# Patient Record
Sex: FEMALE | Race: WHITE | NOT HISPANIC OR LATINO
[De-identification: names, ages, dates, MRNs, and addresses within clinical notes are randomized per-mention and may not be internally consistent; named-entity substitution may affect disease eponyms.]

---

## 2018-04-11 ENCOUNTER — RX ONLY (OUTPATIENT)
Age: 28
Setting detail: RX ONLY
End: 2018-04-11

## 2018-04-11 RX ORDER — EMOLLIENT COMBINATION NO.53
CREAM (GRAM) TOPICAL
Qty: 1 | Refills: 1 | Status: ERX | COMMUNITY
Start: 2018-04-11

## 2018-08-14 ENCOUNTER — APPOINTMENT (RX ONLY)
Dept: URBAN - METROPOLITAN AREA CLINIC 77 | Facility: CLINIC | Age: 28
Setting detail: DERMATOLOGY
End: 2018-08-14

## 2018-08-14 DIAGNOSIS — L90.5 SCAR CONDITIONS AND FIBROSIS OF SKIN: ICD-10-CM

## 2018-08-14 DIAGNOSIS — L85.3 XEROSIS CUTIS: ICD-10-CM

## 2018-08-14 PROCEDURE — 99213 OFFICE O/P EST LOW 20 MIN: CPT

## 2018-08-14 PROCEDURE — ? TREATMENT REGIMEN

## 2018-08-14 PROCEDURE — ? COUNSELING

## 2018-08-14 NOTE — HPI: SCAR
Is This A New Presentation, Or A Follow-Up?: Scar
How Severe Is Your Scar?: mild
Additional History: Patient had a lip piercing on the right side of the lip last December, and took it out because of irritation and was in the way while eating. Patient has done laser treatments and microneedling with hoang Manuel with improvement but would like more consultation as there is still a “pit”present.

## 2018-08-14 NOTE — PROCEDURE: TREATMENT REGIMEN
Plan: Location: lip\\nPlan: excision \\n\\nPatient had a piercing on the left side of the lip last December, and took it out because of irritation while eating. Patient has done 2 laser treatments and 3 microneedling treatments with  Mar with improvement but would like more consultation as there is still a “pit”present.\\n\\nDiscussed with patient microneedling and laser would not get rid of the hole\\nDiscussed with patient we would need to excise the hole. Initially we would use a punch tool to only excise the hole and suture the healthy tissue leaving small stitches \\nDiscussed with patient this would be considered a medical treatment, and should heal without an issue\\n\\nF/u excision
Detail Level: Zone

## 2018-09-18 ENCOUNTER — APPOINTMENT (RX ONLY)
Dept: URBAN - METROPOLITAN AREA CLINIC 77 | Facility: CLINIC | Age: 28
Setting detail: DERMATOLOGY
End: 2018-09-18

## 2018-09-18 DIAGNOSIS — L90.5 SCAR CONDITIONS AND FIBROSIS OF SKIN: ICD-10-CM

## 2018-09-18 DIAGNOSIS — L85.3 XEROSIS CUTIS: ICD-10-CM

## 2018-09-18 PROBLEM — D48.5 NEOPLASM OF UNCERTAIN BEHAVIOR OF SKIN: Status: ACTIVE | Noted: 2018-09-18

## 2018-09-18 PROCEDURE — 99213 OFFICE O/P EST LOW 20 MIN: CPT | Mod: 25

## 2018-09-18 PROCEDURE — ? BIOPSY BY PUNCH METHOD

## 2018-09-18 PROCEDURE — ? COUNSELING

## 2018-09-18 PROCEDURE — 11100: CPT

## 2018-09-18 ASSESSMENT — LOCATION ZONE DERM: LOCATION ZONE: LIP

## 2018-09-18 ASSESSMENT — LOCATION SIMPLE DESCRIPTION DERM: LOCATION SIMPLE: LEFT LIP

## 2018-09-18 ASSESSMENT — LOCATION DETAILED DESCRIPTION DERM: LOCATION DETAILED: LEFT LOWER CUTANEOUS LIP

## 2018-09-18 NOTE — PROCEDURE: BIOPSY BY PUNCH METHOD
Was A Bandage Applied: Yes
Bill For Surgical Tray: no
Billing Type: Third-Party Bill
Dressing: bandage
Wound Care: Bacitracin
Lab Facility: [id number]
Post-Care Instructions: I reviewed with the patient in detail post-care instructions. Patient is to keep the biopsy site dry overnight, and then apply bacitracin twice daily until healed. Patient may apply hydrogen peroxide soaks to remove any crusting.
Consent: Written consent was obtained and risks were reviewed including but not limited to scarring, infection, bleeding, scabbing, incomplete removal, nerve damage and allergy to anesthesia.
Hemostasis: None
X Depth Of Punch In Cm (Optional): 0
Punch Size In Mm: 2.5
Notification Instructions: Patient will be notified of biopsy results. However, patient instructed to call the office if not contacted within 2 weeks.
Anesthesia Volume In Cc (Will Not Render If 0): 0.5
Biopsy Type: H and E
Suture Removal: 7 days
Home Suture Removal Text: Patient was provided a home suture removal kit and will remove their sutures at home.  If they have any questions or difficulties they will call the office.
Lab: [id number]
Detail Level: Simple
Anesthesia Type: 1% lidocaine with epinephrine
Body Location Override (Optional - Billing Will Still Be Based On Selected Body Map Location If Applicable): lip
Epidermal Sutures: 5-0 Ethilon

## 2018-09-25 ENCOUNTER — APPOINTMENT (RX ONLY)
Dept: URBAN - METROPOLITAN AREA CLINIC 77 | Facility: CLINIC | Age: 28
Setting detail: DERMATOLOGY
End: 2018-09-25

## 2018-09-25 DIAGNOSIS — Z48.02 ENCOUNTER FOR REMOVAL OF SUTURES: ICD-10-CM

## 2018-09-25 DIAGNOSIS — L85.3 XEROSIS CUTIS: ICD-10-CM

## 2018-09-25 PROCEDURE — ? SUTURE REMOVAL (GLOBAL PERIOD)

## 2018-09-25 PROCEDURE — ? COUNSELING

## 2018-09-25 ASSESSMENT — LOCATION DETAILED DESCRIPTION DERM
LOCATION DETAILED: LEFT INFERIOR VERMILION LIP
LOCATION DETAILED: LEFT SUPERIOR VERMILION LIP

## 2018-09-25 ASSESSMENT — LOCATION SIMPLE DESCRIPTION DERM: LOCATION SIMPLE: LEFT LIP

## 2018-09-25 ASSESSMENT — LOCATION ZONE DERM: LOCATION ZONE: LIP

## 2018-09-25 NOTE — PROCEDURE: SUTURE REMOVAL (GLOBAL PERIOD)
Body Location Override (Optional - Billing Will Still Be Based On Selected Body Map Location If Applicable): lip
Add 62881 Cpt? (Important Note: In 2017 The Use Of 86382 Is Being Tracked By Cms To Determine Future Global Period Reimbursement For Global Periods): no
Detail Level: Detailed

## 2020-06-22 ENCOUNTER — RX ONLY (OUTPATIENT)
Age: 30
Setting detail: RX ONLY
End: 2020-06-22

## 2020-06-22 RX ORDER — EMOLLIENT COMBINATION NO.53
CREAM (GRAM) TOPICAL
Qty: 1 | Refills: 0 | Status: ERX

## 2021-04-01 ENCOUNTER — APPOINTMENT (RX ONLY)
Dept: URBAN - METROPOLITAN AREA CLINIC 77 | Facility: CLINIC | Age: 31
Setting detail: DERMATOLOGY
End: 2021-04-01

## 2021-04-01 DIAGNOSIS — Z41.9 ENCOUNTER FOR PROCEDURE FOR PURPOSES OTHER THAN REMEDYING HEALTH STATE, UNSPECIFIED: ICD-10-CM

## 2021-04-01 PROCEDURE — ? MICRONEEDLING

## 2021-04-01 PROCEDURE — ? PLATELET RICH PLASMA INJECTION

## 2021-04-01 ASSESSMENT — LOCATION SIMPLE DESCRIPTION DERM
LOCATION SIMPLE: CHIN
LOCATION SIMPLE: NOSE
LOCATION SIMPLE: RIGHT ANTERIOR NECK
LOCATION SIMPLE: LEFT FOREHEAD
LOCATION SIMPLE: LEFT CHEEK
LOCATION SIMPLE: RIGHT CHEEK
LOCATION SIMPLE: LEFT ANTERIOR NECK

## 2021-04-01 ASSESSMENT — LOCATION ZONE DERM
LOCATION ZONE: FACE
LOCATION ZONE: NECK
LOCATION ZONE: NOSE

## 2021-04-01 ASSESSMENT — LOCATION DETAILED DESCRIPTION DERM
LOCATION DETAILED: RIGHT CENTRAL MALAR CHEEK
LOCATION DETAILED: LEFT CENTRAL MALAR CHEEK
LOCATION DETAILED: LEFT MEDIAL FOREHEAD
LOCATION DETAILED: LEFT SUPERIOR MEDIAL FOREHEAD
LOCATION DETAILED: RIGHT SUPERIOR LATERAL NECK
LOCATION DETAILED: RIGHT INFERIOR MEDIAL MALAR CHEEK
LOCATION DETAILED: LEFT SUPERIOR ANTERIOR NECK
LOCATION DETAILED: LEFT CHIN
LOCATION DETAILED: RIGHT LATERAL MALAR CHEEK
LOCATION DETAILED: NASAL SUPRATIP
LOCATION DETAILED: NASAL INFRATIP
LOCATION DETAILED: LEFT SUPERIOR LATERAL NECK
LOCATION DETAILED: LEFT INFERIOR MEDIAL MALAR CHEEK

## 2021-04-01 NOTE — PROCEDURE: MICRONEEDLING
Depth In Mm: 2.5
Length Of Topical Anesthesia Application (Optional): 30 minutes
Post-Care Instructions: After the procedure, take precautions agains sun exposure. Use Gentle Cleanser & Tensage products as recommended. Do not apply sunscreen for 12 hours after the procedure. Do not apply make-up for 12 hours after the procedure. Advised patient not to cleanse until tomorrow morning and no working out or sweating until the following day to prevent further skin irritation. Avoid alcohol based toners for 1-2 days. After 2-3 days patients can return to their regular skin regimen. Applied Skinfuse HA Glide post treatment. Gave patient samples of Gentle cleanser, Skinfuse HA Glide and Sunscreen for post care, as well as detailed post care instructions on how to take care of the treated area.
Topical Anesthesia?: BLT cream (benzocaine 20%, lidocaine 10%, tetracaine 10%)
Location #1: forehead,around eyes, nose and neck
Consent: Written consent obtained, risks reviewed including but not limited to pain, scarring, infection and incomplete improvement.  Patient understands the procedure is cosmetic in nature and will require out of pocket payment.  Cleansed with cleanser, extractions performed and sanitized with alcohol swab.
Location #2: cheeks,lip chin
Depth In Mm: 0.1
Price (Use Numbers Only, No Special Characters Or $): 250.00
Infusions (Optional): PRP
Depth In Mm: 1
Detail Level: Zone

## 2021-04-01 NOTE — PROCEDURE: PLATELET RICH PLASMA INJECTION
Depth In Mm (Will Not Render If 0): 0
Location #2: To glide with through out face with SkinPen
Amount Of Blood Processed (Optional - Include Units): 11ml
Post-Care Instructions: After the procedure, take precautions agains sun exposure. Do not apply sunscreen for 12 hours after the procedure. Do not apply make-up for 12 hours after the procedure. Avoid alcohol based toners for 10-14 days. After 2-3 days patients can return to their regular skin regimen.
Which Technique?: Default
Depth In Mm (Will Not Render If 0): 2.5
Price (Use Numbers Only, No Special Characters Or $): 350.00
Amount Injected At This Location In Cc (Will Not Render If 0): 1.5
Amount Injected At This Location In Cc (Will Not Render If 0): 6
Show Additional Techniques: No
Detail Level: Zone
Location #1: nasolabial folds and marionette lines
Consent: Written consent obtained, risks reviewed including but not limited to pain, scarring, infection and incomplete improvement.  Patient understands the procedure is cosmetic in nature and will require out of pocket payment.

## 2021-09-30 ENCOUNTER — APPOINTMENT (RX ONLY)
Dept: URBAN - METROPOLITAN AREA CLINIC 77 | Facility: CLINIC | Age: 31
Setting detail: DERMATOLOGY
End: 2021-09-30

## 2021-09-30 DIAGNOSIS — Z41.9 ENCOUNTER FOR PROCEDURE FOR PURPOSES OTHER THAN REMEDYING HEALTH STATE, UNSPECIFIED: ICD-10-CM

## 2021-09-30 PROCEDURE — ? PLATELET RICH PLASMA INJECTION

## 2021-09-30 PROCEDURE — ? MICRONEEDLING

## 2021-09-30 ASSESSMENT — LOCATION DETAILED DESCRIPTION DERM
LOCATION DETAILED: RIGHT SUPERIOR LATERAL NECK
LOCATION DETAILED: LEFT SUPERIOR LATERAL NECK
LOCATION DETAILED: RIGHT CENTRAL MALAR CHEEK
LOCATION DETAILED: LEFT SUPERIOR ANTERIOR NECK
LOCATION DETAILED: SUPERIOR MID FOREHEAD
LOCATION DETAILED: LEFT CENTRAL MALAR CHEEK
LOCATION DETAILED: LEFT MEDIAL FOREHEAD
LOCATION DETAILED: NASAL TIP
LOCATION DETAILED: LEFT CHIN
LOCATION DETAILED: RIGHT CHIN
LOCATION DETAILED: RIGHT INFERIOR CENTRAL MALAR CHEEK

## 2021-09-30 ASSESSMENT — LOCATION SIMPLE DESCRIPTION DERM
LOCATION SIMPLE: LEFT ANTERIOR NECK
LOCATION SIMPLE: LEFT FOREHEAD
LOCATION SIMPLE: SUPERIOR FOREHEAD
LOCATION SIMPLE: RIGHT ANTERIOR NECK
LOCATION SIMPLE: CHIN
LOCATION SIMPLE: RIGHT CHEEK
LOCATION SIMPLE: LEFT CHEEK
LOCATION SIMPLE: NOSE

## 2021-09-30 ASSESSMENT — LOCATION ZONE DERM
LOCATION ZONE: NECK
LOCATION ZONE: FACE
LOCATION ZONE: NOSE

## 2021-09-30 NOTE — PROCEDURE: PLATELET RICH PLASMA INJECTION
Depth In Mm (Will Not Render If 0): 2.5
Price (Use Numbers Only, No Special Characters Or $): 350.00
Depth In Mm (Will Not Render If 0): 0
Which Technique?: Default
Show Additional Techniques: No
Consent: Written consent obtained, risks reviewed including but not limited to pain, scarring, infection and incomplete improvement.  Patient understands the procedure is cosmetic in nature and will require out of pocket payment.
Detail Level: Zone
Amount Injected At This Location In Cc (Will Not Render If 0): 6
Post-Care Instructions: After the procedure, take precautions agains sun exposure. Do not apply sunscreen for 12 hours after the procedure. Do not apply make-up for 12 hours after the procedure. Avoid alcohol based toners for 10-14 days. After 2-3 days patients can return to their regular skin regimen.
Location #1: to glide with through out the face using skinpen
Location #2: injected in acne scars throughout cheeks
Amount Of Blood Processed (Optional - Include Units): 11ml

## 2021-09-30 NOTE — PROCEDURE: MICRONEEDLING
Infusions (Optional): PRP
Topical Anesthesia?: BLT ointment (benzocaine 20%, lidocaine 10%, tetracaine 10%)
Depth In Mm: 0.1
Consent: Written consent obtained, risks reviewed including but not limited to pain, scarring, infection and incomplete improvement.  Patient understands the procedure is cosmetic in nature and will require out of pocket payment.  Cleansed with cleanser, extractions performed and sanitized with alcohol swab.
Location #1: forehead, around eyes, nose and neck
Depth In Mm: 2.5
Length Of Topical Anesthesia Application (Optional): 40 minutes
Post-Care Instructions: After the procedure, take precautions agains sun exposure. Use Gentle Cleanser & Tensage products as recommended. Do not apply sunscreen for 12 hours after the procedure. Do not apply make-up for 12 hours after the procedure. Advised patient not to cleanse until tomorrow morning and no working out or sweating until the following day to prevent further skin irritation. Avoid alcohol based toners for 1-2 days. After 2-3 days patients can return to their regular skin regimen. Applied Skinfuse HA Glide post treatment. Gave patient samples of Gentle cleanser, Skinfuse HA Glide and Sunscreen for post care, as well as detailed post care instructions on how to take care of the treated area.
Detail Level: Zone
Depth In Mm: 1
Price (Use Numbers Only, No Special Characters Or $): 250.00
Location #2: lip, chin and cheeks

## 2022-07-11 ENCOUNTER — APPOINTMENT (RX ONLY)
Dept: URBAN - METROPOLITAN AREA CLINIC 77 | Facility: CLINIC | Age: 32
Setting detail: DERMATOLOGY
End: 2022-07-11

## 2022-07-11 DIAGNOSIS — Z41.9 ENCOUNTER FOR PROCEDURE FOR PURPOSES OTHER THAN REMEDYING HEALTH STATE, UNSPECIFIED: ICD-10-CM

## 2022-07-11 PROCEDURE — ? MICRONEEDLING

## 2022-07-11 ASSESSMENT — LOCATION DETAILED DESCRIPTION DERM
LOCATION DETAILED: LEFT MEDIAL FOREHEAD
LOCATION DETAILED: LEFT CENTRAL MALAR CHEEK
LOCATION DETAILED: RIGHT CHIN
LOCATION DETAILED: NASAL TIP
LOCATION DETAILED: RIGHT CENTRAL MALAR CHEEK

## 2022-07-11 ASSESSMENT — LOCATION SIMPLE DESCRIPTION DERM
LOCATION SIMPLE: CHIN
LOCATION SIMPLE: NOSE
LOCATION SIMPLE: LEFT FOREHEAD
LOCATION SIMPLE: RIGHT CHEEK
LOCATION SIMPLE: LEFT CHEEK

## 2022-07-11 ASSESSMENT — LOCATION ZONE DERM
LOCATION ZONE: NOSE
LOCATION ZONE: FACE

## 2022-07-11 NOTE — PROCEDURE: MICRONEEDLING
Infusions (Optional): PRP
Topical Anesthesia?: BLT ointment (benzocaine 20%, lidocaine 10%, tetracaine 10%)
Depth In Mm: 0.1
Consent: Written consent obtained, risks reviewed including but not limited to pain, scarring, infection and incomplete improvement.  Patient understands the procedure is cosmetic in nature and will require out of pocket payment.  Cleansed with cleanser, extractions performed and sanitized with alcohol swab.
Location #1: forehead, cheeks, chin
Depth In Mm: 1
Length Of Topical Anesthesia Application (Optional): 30 minutes
Post-Care Instructions: After the procedure, take precautions agains sun exposure. Use Gentle Cleanser & Tensage products as recommended. Do not apply sunscreen for 12 hours after the procedure. Do not apply make-up for 12 hours after the procedure. Advised patient not to cleanse until tomorrow morning and no working out or sweating until the following day to prevent further skin irritation. Avoid alcohol based toners for 1-2 days. After 2-3 days patients can return to their regular skin regimen. Applied Skinfuse HA Glide post treatment. Gave patient samples of Gentle cleanser, Skinfuse HA Glide and Sunscreen for post care, as well as detailed post care instructions on how to take care of the treated area.
Detail Level: Zone
Price (Use Numbers Only, No Special Characters Or $): 278
Location #2: lip, eyes, temple

## 2022-07-11 NOTE — HPI: OTHER
Condition:: Micro-needling with PRP infusion
Please Describe Your Condition:: Acne scars, skin texture and pigmentation

## 2022-08-05 ENCOUNTER — APPOINTMENT (RX ONLY)
Dept: URBAN - METROPOLITAN AREA CLINIC 77 | Facility: CLINIC | Age: 32
Setting detail: DERMATOLOGY
End: 2022-08-05

## 2022-08-05 DIAGNOSIS — Z41.9 ENCOUNTER FOR PROCEDURE FOR PURPOSES OTHER THAN REMEDYING HEALTH STATE, UNSPECIFIED: ICD-10-CM

## 2022-08-05 PROCEDURE — ? VENUS VERSA IPL

## 2022-08-05 ASSESSMENT — LOCATION ZONE DERM: LOCATION ZONE: FACE

## 2022-08-05 ASSESSMENT — LOCATION SIMPLE DESCRIPTION DERM
LOCATION SIMPLE: CHIN
LOCATION SIMPLE: RIGHT CHEEK
LOCATION SIMPLE: LEFT CHEEK
LOCATION SIMPLE: INFERIOR FOREHEAD

## 2022-08-05 ASSESSMENT — LOCATION DETAILED DESCRIPTION DERM
LOCATION DETAILED: RIGHT CHIN
LOCATION DETAILED: LEFT INFERIOR MEDIAL MALAR CHEEK
LOCATION DETAILED: RIGHT INFERIOR CENTRAL MALAR CHEEK
LOCATION DETAILED: RIGHT INFERIOR MEDIAL MALAR CHEEK
LOCATION DETAILED: INFERIOR MID FOREHEAD
LOCATION DETAILED: LEFT INFERIOR CENTRAL MALAR CHEEK

## 2022-08-05 NOTE — PROCEDURE: VENUS VERSA IPL
Detail Level: Zone
Frequency: 1 Hz
Pulse Duration (In Milliseconds): 10
Skin Type (Optional): II
Consent: Written consent obtained, risks reviewed including but not limited to crusting, scabbing, blistering, scarring, darker or lighter pigmentary change, bruising, and/or incomplete response.
Fluence: 14
Pulse Mode: single
Post-Care Instructions: I reviewed with the patient in detail post-care instructions. Patient should stay away from the sun and wear sun protection until treated areas are fully healed.
Number Of Passes: 2
Fluence Units: J/cm2
Treatment Number: 1
Price (Use Numbers Only, No Special Characters Or $): 300
Anesthesia Volume In Cc: 0
Applicator: [facility]
Treated Area: medium area
Coolin

## 2022-08-12 ENCOUNTER — APPOINTMENT (RX ONLY)
Dept: URBAN - METROPOLITAN AREA CLINIC 77 | Facility: CLINIC | Age: 32
Setting detail: DERMATOLOGY
End: 2022-08-12

## 2022-08-12 DIAGNOSIS — Z41.9 ENCOUNTER FOR PROCEDURE FOR PURPOSES OTHER THAN REMEDYING HEALTH STATE, UNSPECIFIED: ICD-10-CM

## 2022-08-12 PROCEDURE — ? BOTOX (U OR CC)

## 2022-08-12 ASSESSMENT — LOCATION DETAILED DESCRIPTION DERM
LOCATION DETAILED: RIGHT SUPERIOR MEDIAL FOREHEAD
LOCATION DETAILED: LEFT SUPERIOR FOREHEAD
LOCATION DETAILED: GLABELLA
LOCATION DETAILED: RIGHT INFERIOR MEDIAL FOREHEAD
LOCATION DETAILED: LEFT FOREHEAD
LOCATION DETAILED: LEFT INFERIOR MEDIAL FOREHEAD
LOCATION DETAILED: RIGHT SUPERIOR FOREHEAD
LOCATION DETAILED: LEFT MEDIAL FOREHEAD
LOCATION DETAILED: RIGHT FOREHEAD
LOCATION DETAILED: LEFT CENTRAL EYEBROW

## 2022-08-12 ASSESSMENT — LOCATION SIMPLE DESCRIPTION DERM
LOCATION SIMPLE: RIGHT FOREHEAD
LOCATION SIMPLE: GLABELLA
LOCATION SIMPLE: LEFT EYEBROW
LOCATION SIMPLE: LEFT FOREHEAD

## 2022-08-12 ASSESSMENT — LOCATION ZONE DERM: LOCATION ZONE: FACE

## 2022-08-12 NOTE — PROCEDURE: BOTOX (U OR CC)
Anterior Platysmal Bands Units: 0
Reconstitution Date (Optional): 8/9/22
Consent: Written consent obtained. Risks include but not limited to lid/brow ptosis, bruising, swelling, diplopia, temporary effect, incomplete chemical denervation.
Including Pricing Information In The Note: No
Lot #: [id number]
Glabellar Complex Units: 6
Expiration Date (Month Year): 06/2024
Forehead Units/Cc: 15
Document As Units Or Cc?: units
Post-Care Instructions: Patient instructed to not lie down for 4 hours and limit physical activity for 24 hours.
Dilution (U/0.1 Cc): 4
Detail Level: Detailed
Price (Use Numbers Only, No Special Characters Or $): 294
Quantity Per Injection Site (Units Or Cc): 2 U
Quantity Per Injection Site (Units Or Cc): 1 U

## 2022-08-25 ENCOUNTER — APPOINTMENT (RX ONLY)
Dept: URBAN - METROPOLITAN AREA CLINIC 77 | Facility: CLINIC | Age: 32
Setting detail: DERMATOLOGY
End: 2022-08-25

## 2022-08-25 DIAGNOSIS — Z41.9 ENCOUNTER FOR PROCEDURE FOR PURPOSES OTHER THAN REMEDYING HEALTH STATE, UNSPECIFIED: ICD-10-CM

## 2022-08-25 PROCEDURE — ? HYDRAFACIAL

## 2022-08-25 NOTE — HPI: OTHER
Condition:: Hydra facial
Please Describe Your Condition:: is an established patient who is being seen for a chief complaint of skin dehydration and rejuvenation

## 2022-08-25 NOTE — PROCEDURE: HYDRAFACIAL
Number Of Passes: 1
Solution Override
Vacuum Pressure High Setting (Will Not Render If Set To 0): 16
Solution: Activ-4
Procedure: Peel
Vacuum Pressure High Setting (Will Not Render If Set To 0): 16
Solution: Antiox-6
Tip: Hydropeel Tip, Teal
Tip: Hydropeel Tip, Clear
Vacuum Pressure Low Setting (Will Not Render If Set To 0): 14
Vacuum Pressure Low Setting (Will Not Render If Set To 0): 14
Price (Use Numbers Only, No Special Characters Or $): 550
Solution: GlySal 7.5%
Procedure: Fusion
Procedure: Extraction
Tip Override
Consent: Written consent obtained, risks reviewed including but not limited to crusting, scabbing, blistering, scarring, darker or lighter pigmentary change, bruising, and/or incomplete response.
Number Of Passes: 2
Tip: Hydropeel Tip, Blue
Indication: dehydrated skin
Post-Care Instructions: I reviewed with the patient in detail post-care instructions. Patient should stay away from the sun and wear sun protection until treated areas are fully healed.
Procedure: Exfoliation
Solution: Beta-HD
Location: face
Procedure: Extend and Protect

## 2022-09-12 ENCOUNTER — APPOINTMENT (RX ONLY)
Dept: URBAN - METROPOLITAN AREA CLINIC 77 | Facility: CLINIC | Age: 32
Setting detail: DERMATOLOGY
End: 2022-09-12

## 2022-09-12 DIAGNOSIS — Z41.9 ENCOUNTER FOR PROCEDURE FOR PURPOSES OTHER THAN REMEDYING HEALTH STATE, UNSPECIFIED: ICD-10-CM

## 2022-09-12 PROCEDURE — ? MICRONEEDLING

## 2022-09-12 NOTE — PROCEDURE: MICRONEEDLING
Location #2: Cheeks
Depth In Mm: 0.5
Infusions (Optional): PRP
Post-Care Instructions: After the procedure, take precautions agains sun exposure. Do not apply sunscreen for 12 hours after the procedure. Do not apply make-up for 12 hours after the procedure. Avoid alcohol based toners for 10-14 days. After 2-3 days patients can return to their regular skin regimen.
Treatment Number (Optional): 0
Consent: Written consent obtained, risks reviewed including but not limited to pain, scarring, infection and incomplete improvement.  Patient understands the procedure is cosmetic in nature and will require out of pocket payment.
Depth In Mm: 1
Price (Use Numbers Only, No Special Characters Or $): 988
Detail Level: Zone
Depth In Mm: 1.5
Location #3: eyes nose
Topical Anesthesia?: BLT cream (benzocaine 20%, lidocaine 10%, tetracaine 10%)
Depth In Mm: 0.1
Location #1: Forehead temples chin
Length Of Topical Anesthesia Application (Optional): 30 minutes

## 2022-09-12 NOTE — HPI: COSMETIC (MICRONEEDLING)
Have You Had Microneedling Treatments Before?: has had a previous microneedling treatment
When Was Your Last Treatment?: 07/11/2022

## 2022-10-10 ENCOUNTER — APPOINTMENT (RX ONLY)
Dept: URBAN - METROPOLITAN AREA CLINIC 77 | Facility: CLINIC | Age: 32
Setting detail: DERMATOLOGY
End: 2022-10-10

## 2022-10-10 DIAGNOSIS — Z41.9 ENCOUNTER FOR PROCEDURE FOR PURPOSES OTHER THAN REMEDYING HEALTH STATE, UNSPECIFIED: ICD-10-CM

## 2022-10-10 PROCEDURE — ? MICRONEEDLING

## 2022-10-10 NOTE — PROCEDURE: MICRONEEDLING
Location #2: Cheeks
Depth In Mm: 0.5
Infusions (Optional): PRP
Post-Care Instructions: After the procedure, take precautions agains sun exposure. Do not apply sunscreen for 12 hours after the procedure. Do not apply make-up for 12 hours after the procedure. Avoid alcohol based toners for 10-14 days. After 2-3 days patients can return to their regular skin regimen.
Treatment Number (Optional): 0
Consent: Written consent obtained, risks reviewed including but not limited to pain, scarring, infection and incomplete improvement.  Patient understands the procedure is cosmetic in nature and will require out of pocket payment.
Depth In Mm: 1
Price (Use Numbers Only, No Special Characters Or $): 023
Detail Level: Zone
Depth In Mm: 1.5
Location #3: eyes nose
Topical Anesthesia?: BLT cream (benzocaine 20%, lidocaine 10%, tetracaine 10%)
Depth In Mm: 0.1
Location #1: Forehead temples chin
Length Of Topical Anesthesia Application (Optional): 30 minutes

## 2022-11-07 ENCOUNTER — APPOINTMENT (RX ONLY)
Dept: URBAN - METROPOLITAN AREA CLINIC 77 | Facility: CLINIC | Age: 32
Setting detail: DERMATOLOGY
End: 2022-11-07

## 2022-11-07 DIAGNOSIS — Z41.9 ENCOUNTER FOR PROCEDURE FOR PURPOSES OTHER THAN REMEDYING HEALTH STATE, UNSPECIFIED: ICD-10-CM

## 2022-11-07 PROCEDURE — ? VENUS VIVA

## 2022-11-07 ASSESSMENT — LOCATION SIMPLE DESCRIPTION DERM
LOCATION SIMPLE: CHIN
LOCATION SIMPLE: INFERIOR FOREHEAD
LOCATION SIMPLE: RIGHT CHEEK
LOCATION SIMPLE: LEFT CHEEK

## 2022-11-07 ASSESSMENT — LOCATION DETAILED DESCRIPTION DERM
LOCATION DETAILED: LEFT INFERIOR CENTRAL MALAR CHEEK
LOCATION DETAILED: LEFT CHIN
LOCATION DETAILED: INFERIOR MID FOREHEAD
LOCATION DETAILED: RIGHT INFERIOR CENTRAL MALAR CHEEK

## 2022-11-07 ASSESSMENT — LOCATION ZONE DERM: LOCATION ZONE: FACE

## 2022-11-07 NOTE — PROCEDURE: VENUS VIVA
Starting Temperature In C: 35
Starting Temperature In C: 35
Treatment Number: 1
Post-Procedure Text: Post care reviewed with patient.
Milliseconds: 10
Applicator: [employer]
Ending Temperature In C: 45
Immediate Post-Procedure Findings: mild erythema, mild edema
Location: face
Ending Temperature In C: 45
Volts: 200
Milliseconds: 15
Consent: Written consent obtained, risks reviewed including but not limited to crusting, scabbing, blistering, scarring, darker or lighter pigmentary change, and/or incomplete removal.
Topical Anesthesia?: 20% benzocaine, 7% lidocaine, and 7% tetracaine
Pre-Procedures Photographs: Yes
Volts: 240
Detail Level: Simple
Applicator: Viva
Post-Care Instructions: I reviewed with the patient in detail post-care instructions. Patient should stay away from the sun and wear sun protection until treated areas are fully healed.
Price (Use Numbers Only, No Special Characters Or $): 101
Applicator: [employer]
Post-Procedures Photographs: No
Patient Discomfort: mild
Length Topical Anesthesia Applied (Optional): 30 minutes

## 2022-11-07 NOTE — HPI: OTHER
Condition:: acne scars,  skin texture and pore size
Please Describe Your Condition:: is an established patient who is being seen for a chief complaint of mild acne scars,  skin texture and pore size.

## 2022-11-21 ENCOUNTER — APPOINTMENT (RX ONLY)
Dept: URBAN - METROPOLITAN AREA CLINIC 77 | Facility: CLINIC | Age: 32
Setting detail: DERMATOLOGY
End: 2022-11-21

## 2022-11-21 DIAGNOSIS — Z41.9 ENCOUNTER FOR PROCEDURE FOR PURPOSES OTHER THAN REMEDYING HEALTH STATE, UNSPECIFIED: ICD-10-CM

## 2022-11-21 PROCEDURE — ? HYDRAFACIAL

## 2022-11-21 NOTE — PROCEDURE: HYDRAFACIAL
Number Of Passes: 1
Solution Override
Vacuum Pressure High Setting (Will Not Render If Set To 0): 16
Solution: Activ-4
Procedure: Peel
Vacuum Pressure High Setting (Will Not Render If Set To 0): 16
Solution: Antiox-6
Tip: Hydropeel Tip, Teal
Tip: Hydropeel Tip, Clear
Vacuum Pressure Low Setting (Will Not Render If Set To 0): 14
Vacuum Pressure Low Setting (Will Not Render If Set To 0): 14
Price (Use Numbers Only, No Special Characters Or $): 473
Solution: GlySal 7.5%
Procedure: Fusion
Procedure: Extraction
Tip Override
Consent: Written consent obtained, risks reviewed including but not limited to crusting, scabbing, blistering, scarring, darker or lighter pigmentary change, bruising, and/or incomplete response.
Number Of Passes: 2
Tip: Hydropeel Tip, Blue
Indication: dehydrated skin
Post-Care Instructions: I reviewed with the patient in detail post-care instructions. Patient should stay away from the sun and wear sun protection until treated areas are fully healed.
Procedure: Exfoliation
Solution: Beta-HD
Location: face
Procedure: Extend and Protect

## 2022-12-12 ENCOUNTER — APPOINTMENT (RX ONLY)
Dept: URBAN - METROPOLITAN AREA CLINIC 77 | Facility: CLINIC | Age: 32
Setting detail: DERMATOLOGY
End: 2022-12-12

## 2022-12-12 DIAGNOSIS — Z41.9 ENCOUNTER FOR PROCEDURE FOR PURPOSES OTHER THAN REMEDYING HEALTH STATE, UNSPECIFIED: ICD-10-CM

## 2022-12-12 PROCEDURE — ? MICRONEEDLING

## 2022-12-12 NOTE — PROCEDURE: MICRONEEDLING
Location #2: Cheeks
Depth In Mm: 0.5
Infusions (Optional): PRP
Post-Care Instructions: After the procedure, take precautions agains sun exposure. Do not apply sunscreen for 12 hours after the procedure. Do not apply make-up for 12 hours after the procedure. Avoid alcohol based toners for 10-14 days. After 2-3 days patients can return to their regular skin regimen.
Treatment Number (Optional): 0
Consent: Written consent obtained, risks reviewed including but not limited to pain, scarring, infection and incomplete improvement.  Patient understands the procedure is cosmetic in nature and will require out of pocket payment.
Depth In Mm: 1
Price (Use Numbers Only, No Special Characters Or $): 718
Detail Level: Zone
Depth In Mm: 1.5
Location #3: eyes nose
Topical Anesthesia?: BLT cream (benzocaine 20%, lidocaine 10%, tetracaine 10%)
Depth In Mm: 0.1
Location #1: Forehead temples chin
Length Of Topical Anesthesia Application (Optional): 30 minutes

## 2023-03-03 ENCOUNTER — APPOINTMENT (RX ONLY)
Dept: URBAN - METROPOLITAN AREA CLINIC 114 | Facility: CLINIC | Age: 33
Setting detail: DERMATOLOGY
End: 2023-03-03

## 2023-03-03 DIAGNOSIS — Z41.9 ENCOUNTER FOR PROCEDURE FOR PURPOSES OTHER THAN REMEDYING HEALTH STATE, UNSPECIFIED: ICD-10-CM

## 2023-03-03 PROCEDURE — ? HYDRAFACIAL

## 2023-03-03 NOTE — PROCEDURE: HYDRAFACIAL
Number Of Passes: 1
Solution Override
Vacuum Pressure High Setting (Will Not Render If Set To 0): 16
Solution: Activ-4
Procedure: Peel
Vacuum Pressure High Setting (Will Not Render If Set To 0): 16
Solution: Antiox-6
Tip: Hydropeel Tip, Teal
Tip: Hydropeel Tip, Clear
Vacuum Pressure Low Setting (Will Not Render If Set To 0): 14
Vacuum Pressure Low Setting (Will Not Render If Set To 0): 14
Price (Use Numbers Only, No Special Characters Or $): 202
Solution: GlySal 7.5%
Procedure: Fusion
Procedure: Extraction
Tip Override
Consent: Written consent obtained, risks reviewed including but not limited to crusting, scabbing, blistering, scarring, darker or lighter pigmentary change, bruising, and/or incomplete response.
Number Of Passes: 2
Tip: Hydropeel Tip, Blue
Indication: dehydrated skin
Post-Care Instructions: I reviewed with the patient in detail post-care instructions. Patient should stay away from the sun and wear sun protection until treated areas are fully healed.
Procedure: Exfoliation
Solution: Beta-HD
Location: face
Procedure: Extend and Protect

## 2023-03-23 ENCOUNTER — APPOINTMENT (RX ONLY)
Dept: URBAN - METROPOLITAN AREA CLINIC 114 | Facility: CLINIC | Age: 33
Setting detail: DERMATOLOGY
End: 2023-03-23

## 2023-03-23 DIAGNOSIS — Z41.9 ENCOUNTER FOR PROCEDURE FOR PURPOSES OTHER THAN REMEDYING HEALTH STATE, UNSPECIFIED: ICD-10-CM

## 2023-03-23 PROCEDURE — ? MICRONEEDLING

## 2023-03-23 NOTE — PROCEDURE: MICRONEEDLING
Location #2: Cheeks
Depth In Mm: 0.5
Infusions (Optional): PRP
Post-Care Instructions: After the procedure, take precautions agains sun exposure. Do not apply sunscreen for 12 hours after the procedure. Do not apply make-up for 12 hours after the procedure. Avoid alcohol based toners for 10-14 days. After 2-3 days patients can return to their regular skin regimen.
Treatment Number (Optional): 0
Consent: Written consent obtained, risks reviewed including but not limited to pain, scarring, infection and incomplete improvement.  Patient understands the procedure is cosmetic in nature and will require out of pocket payment.
Depth In Mm: 1
Price (Use Numbers Only, No Special Characters Or $): 734
Detail Level: Zone
Depth In Mm: 1.5
Location #3: eyes nose
Topical Anesthesia?: BLT cream (benzocaine 20%, lidocaine 10%, tetracaine 10%)
Depth In Mm: 0.1
Location #1: Forehead temples chin
Length Of Topical Anesthesia Application (Optional): 30 minutes